# Patient Record
Sex: FEMALE | Race: WHITE | NOT HISPANIC OR LATINO | Employment: FULL TIME | URBAN - METROPOLITAN AREA
[De-identification: names, ages, dates, MRNs, and addresses within clinical notes are randomized per-mention and may not be internally consistent; named-entity substitution may affect disease eponyms.]

---

## 2024-02-01 PROBLEM — H90.6 MIXED CONDUCTIVE AND SENSORINEURAL HEARING LOSS OF BOTH EARS: Status: ACTIVE | Noted: 2024-02-01

## 2024-02-01 PROBLEM — R09.81 NASAL CONGESTION: Status: ACTIVE | Noted: 2024-02-01

## 2024-02-01 PROBLEM — H72.91 TYMPANIC MEMBRANE PERFORATION, RIGHT: Status: ACTIVE | Noted: 2024-02-01

## 2024-02-01 PROBLEM — E04.2 MULTIPLE THYROID NODULES: Status: ACTIVE | Noted: 2019-12-10

## 2024-02-01 PROBLEM — H66.90 OTITIS MEDIA: Status: ACTIVE | Noted: 2024-02-01

## 2024-02-01 PROBLEM — H90.5 SENSORINEURAL HEARING LOSS OF LEFT EAR: Status: ACTIVE | Noted: 2024-02-01

## 2024-02-01 PROBLEM — H61.20 IMPACTED CERUMEN: Status: ACTIVE | Noted: 2024-02-01

## 2024-02-01 PROBLEM — Z86.39 HISTORY OF DIABETES MELLITUS: Status: ACTIVE | Noted: 2024-02-01

## 2024-02-01 PROBLEM — E78.00 HYPERCHOLESTEROLEMIA: Status: ACTIVE | Noted: 2024-02-01

## 2024-02-01 PROBLEM — H90.72 MIXED HEARING LOSS OF LEFT EAR: Status: ACTIVE | Noted: 2024-02-01

## 2024-02-01 PROBLEM — Z86.79 HISTORY OF HYPERTENSION: Status: ACTIVE | Noted: 2024-02-01

## 2024-02-01 RX ORDER — ATORVASTATIN CALCIUM 80 MG/1
80 TABLET, FILM COATED ORAL
COMMUNITY
Start: 2015-11-09

## 2024-02-01 RX ORDER — ACETAMINOPHEN 500 MG
500 TABLET ORAL EVERY 8 HOURS PRN
COMMUNITY
Start: 2021-07-27

## 2024-02-01 RX ORDER — NAPROXEN 500 MG/1
TABLET ORAL
COMMUNITY
Start: 2014-04-18 | End: 2024-02-08 | Stop reason: ALTCHOICE

## 2024-02-01 RX ORDER — METFORMIN HYDROCHLORIDE 1000 MG/1
1000 TABLET ORAL 2 TIMES DAILY
COMMUNITY
Start: 2018-02-17

## 2024-02-01 RX ORDER — ACETAMINOPHEN 500 MG
TABLET ORAL
COMMUNITY

## 2024-02-01 RX ORDER — ASPIRIN 81 MG/1
81 TABLET ORAL
COMMUNITY
End: 2024-02-08 | Stop reason: ALTCHOICE

## 2024-02-01 RX ORDER — FLUTICASONE PROPIONATE 50 MCG
SPRAY, SUSPENSION (ML) NASAL
COMMUNITY
Start: 2015-11-09

## 2024-02-01 RX ORDER — LISINOPRIL 5 MG/1
5 TABLET ORAL
COMMUNITY
Start: 2014-07-10

## 2024-02-01 RX ORDER — OMEPRAZOLE 40 MG/1
40 CAPSULE, DELAYED RELEASE ORAL
COMMUNITY
Start: 2018-03-19

## 2024-02-01 RX ORDER — FERROUS SULFATE 325(65) MG
325 TABLET ORAL
COMMUNITY
Start: 2021-07-27 | End: 2024-02-08 | Stop reason: ALTCHOICE

## 2024-02-01 RX ORDER — ENOXAPARIN SODIUM 100 MG/ML
1 INJECTION SUBCUTANEOUS
COMMUNITY
Start: 2021-07-27 | End: 2024-02-08 | Stop reason: ALTCHOICE

## 2024-02-01 RX ORDER — EZETIMIBE 10 MG/1
10 TABLET ORAL
COMMUNITY
Start: 2021-07-20

## 2024-02-05 PROBLEM — H90.5 SENSORINEURAL HEARING LOSS OF LEFT EAR: Status: RESOLVED | Noted: 2024-02-01 | Resolved: 2024-02-05

## 2024-02-05 NOTE — PROGRESS NOTES
Subjective   Patient ID: Arlette Steel is a 63 y.o. female who presents for No chief complaint on file..  HPI  This 63-year-old female is being seen back in the office for a recheck of her ears primarily.  She was seen back in September at which point a foreign body of the right ear in the form of an insect was removed.  This required anesthetizing the canal for its removal.  There did not appear to be any trauma to the tympanic membrane.  She was placed on Floxin drops post removal of the foreign body and was to keep water out of ears.  She does have a history of mixed hearing loss and a repeat audiogram is scheduled.  She does have a pre-existing small pinpoint perforation of the right tympanic membrane as well.  No recent otorrhea has been noted.  There is been no history of upper respite nora tract problems.  Review of Systems  A 12 point ROS has been reviewed and are negative for complaint except what is stated in the history of present illness and/or past medical history as noted in the EMR  Objective   Physical Exam  EXAMINATION:    GENERAL ANTONINA.EARANCE: Alert, in no acute distress, normal pitch and clarity of voice, well-developed and nourished, cooperative.    HEAD/FACE: Normocephalic, atraumatic, normal facial movements and strength, no no tenderness to palpation, no lesions noted.    SKIN: Normal turgor, no raised or ulcerative lesions, warm and dry to palpation.    EYES: Extraocular motions intact, no nystagmus noted, pupils equal and reactive to light and accommodation, no conjunctivitis.    EARS:      NOSE: No external skin lesions are noted, nares are patent, septum is intact, sinuses are nontender to palpation bilaterally, no intranasal lesions or inflammation is noted, nasal valve is normal.    OROPHARYNX/ORAL CAVITY: Oropharynx is not inflamed and is without lesions, mucosa of the oral cavity is intact and without lesions, tongue is midline and mobile, no acute dental disease is noted, TMJs are  mobile    LUNG-- NO wheezing or rhonchi normal respiratory effort    HEART-- No venous congestion,  rate and rhythm regular,    NECK: No lymphadenopathy is palpated, carotid pulses are intact, neck is supple with full range of motion, no thyroid abnormalities are noted, trachea is midline, no neck masses are palpated.    LYMPHATICS: No cervical adenopathy or supraclavicular adenopathy is palpated.    NEUROLOGIC/PSYCH; alert and oriented, cranial nerves are grossly intact, gait is without falling, no motor deficits are noted.     Her audiogram today in the left ear revealed evidence for mild hearing loss at 250 Hz and 8000 Hz otherwise low normal to mid normal hearing was noted.  On the right-hand side there is a mixed hearing loss moderate up to 250 Hz mild for the remaining frequencies except for some normal readings at 4000 Hz.  Discrimination scores are 100% bilaterally.  Tympanogram on the left was normal perforation pattern was noted on the right     Assessment/Plan   Problem List Items Addressed This Visit             ICD-10-CM    Mixed conductive and sensorineural hearing loss of both ears - Primary H90.6    Nasal congestion R09.81    Tympanic membrane perforation, right H72.91        I discussed the clinical finds with the patient.  Her exam today was negative for any signs of acute inflammation or drainage from the right ear especially where there is a perforation.  She has stable hearing with some improvement on the left compared to her last audiogram where fluid was noted.  She has the option of having the right ear treated surgically by tympanoplasty or if she was to improve the hearing on the right without surgery then a hearing aid would be helpful.  She is always contact the office if there is drainage pain or difficulties with balance.  Recheck in 1 year was recommended otherwise.  If she wishes to consider surgery then a referral to otology would be placed.    Tray Fairchild DMD, MD 02/05/24  10:34 AM

## 2024-02-08 ENCOUNTER — OFFICE VISIT (OUTPATIENT)
Dept: OTOLARYNGOLOGY | Facility: CLINIC | Age: 64
End: 2024-02-08
Payer: COMMERCIAL

## 2024-02-08 ENCOUNTER — CLINICAL SUPPORT (OUTPATIENT)
Dept: AUDIOLOGY | Facility: CLINIC | Age: 64
End: 2024-02-08
Payer: COMMERCIAL

## 2024-02-08 VITALS — HEIGHT: 67 IN | WEIGHT: 218 LBS | BODY MASS INDEX: 34.21 KG/M2

## 2024-02-08 DIAGNOSIS — H90.6 MIXED CONDUCTIVE AND SENSORINEURAL HEARING LOSS OF BOTH EARS: Primary | ICD-10-CM

## 2024-02-08 DIAGNOSIS — R09.81 NASAL CONGESTION: ICD-10-CM

## 2024-02-08 DIAGNOSIS — H90.A31 MIXED CONDUCTIVE AND SENSORINEURAL HEARING LOSS OF RIGHT EAR WITH RESTRICTED HEARING OF LEFT EAR: Primary | ICD-10-CM

## 2024-02-08 DIAGNOSIS — H72.91 TYMPANIC MEMBRANE PERFORATION, RIGHT: ICD-10-CM

## 2024-02-08 PROBLEM — Z86.79 HISTORY OF HYPERTENSION: Status: RESOLVED | Noted: 2024-02-01 | Resolved: 2024-02-08

## 2024-02-08 PROCEDURE — 99214 OFFICE O/P EST MOD 30 MIN: CPT | Performed by: OTOLARYNGOLOGY

## 2024-02-08 PROCEDURE — 1159F MED LIST DOCD IN RCRD: CPT | Performed by: OTOLARYNGOLOGY

## 2024-02-08 PROCEDURE — 92557 COMPREHENSIVE HEARING TEST: CPT | Performed by: AUDIOLOGIST

## 2024-02-08 PROCEDURE — 1036F TOBACCO NON-USER: CPT | Performed by: OTOLARYNGOLOGY

## 2024-02-08 PROCEDURE — 1160F RVW MEDS BY RX/DR IN RCRD: CPT | Performed by: OTOLARYNGOLOGY

## 2024-02-08 PROCEDURE — 92567 TYMPANOMETRY: CPT | Performed by: AUDIOLOGIST

## 2024-02-08 RX ORDER — EMPAGLIFLOZIN 25 MG/1
TABLET, FILM COATED ORAL
COMMUNITY
Start: 2024-02-01

## 2024-02-08 NOTE — PROGRESS NOTES
[facility]  AUDIOMETRIC EVALUATION      Name:  Arlette Steel   :  1960  Age:  63 y.o.  Date of Evaluation:  24    HISTORY    Arlette Steel is seen today at the request of rTay Fairchild DMD, MD, FACS. Patient stated that she feels her hearing has improved from her last evaluation. She further stated that she feels her hearing is better in her left ear. She reported no furtehr middle ear problems within the past year.    EVALUATION  See Audiogram    RESULTS    Otoscopic Evaluation:  Right Ear:  clear canal  Left Ear:  clear canal    Tympanometry:  Right Ear: Large ear canal volume, consistent with a patent myringotomy tube or tympanic membrane perforation  Left Ear:  Type A-consistent with normal eardrum mobility and middle ear pressure     Pure Tone Audiometry:    Right Ear:  moderate mixed hearing loss rising to hearing within normal limits at 4000 Hz. Before sloping to a mild lossto mild mixedhearing loss    Left Ear:  normal hearing sloping to a mild loss at 250 Hz. before rising to hearing within normal limits at 4000 Hz. before sloping to a mild sensorineural hearing loss.     Speech Audiometry:   Right Ear:  excellent in quiet when words were presented at 65 dBHL. Masking was present on the contralateral side    Left Ear:  excellent in quiet when words were presented at 50 dBHL    Speech reception thresholds were in good agreement with pure tone testing.    DISCUSSION  Results were relayed to Tray Fairchild DMD, MD, FACS    APPOINTMENT TIME  9:00 - 9:30 AM      Kaylen Quintana MA, CCC/A  Licensed Audiologist